# Patient Record
Sex: FEMALE | URBAN - METROPOLITAN AREA
[De-identification: names, ages, dates, MRNs, and addresses within clinical notes are randomized per-mention and may not be internally consistent; named-entity substitution may affect disease eponyms.]

---

## 2017-12-29 ENCOUNTER — TRANSFERRED RECORDS (OUTPATIENT)
Dept: HEALTH INFORMATION MANAGEMENT | Facility: CLINIC | Age: 22
End: 2017-12-29

## 2017-12-30 ENCOUNTER — HOSPITAL ENCOUNTER (INPATIENT)
Facility: CLINIC | Age: 22
LOS: 5 days | Discharge: HOME | End: 2018-01-04
Attending: PSYCHIATRY & NEUROLOGY | Admitting: PSYCHIATRY & NEUROLOGY
Payer: COMMERCIAL

## 2017-12-30 DIAGNOSIS — F33.1 MAJOR DEPRESSIVE DISORDER, RECURRENT EPISODE, MODERATE (H): ICD-10-CM

## 2017-12-30 DIAGNOSIS — F41.9 ANXIETY: Primary | ICD-10-CM

## 2017-12-30 PROBLEM — R45.89 SUICIDAL BEHAVIOR: Status: ACTIVE | Noted: 2017-12-30

## 2017-12-30 PROCEDURE — 12400001 ZZH R&B MH UMMC

## 2017-12-30 PROCEDURE — HZ2ZZZZ DETOXIFICATION SERVICES FOR SUBSTANCE ABUSE TREATMENT: ICD-10-PCS | Performed by: NURSE PRACTITIONER

## 2017-12-30 PROCEDURE — 25000132 ZZH RX MED GY IP 250 OP 250 PS 637: Performed by: PSYCHIATRY & NEUROLOGY

## 2017-12-30 PROCEDURE — 99207 ZZC CDG-HISTORY COMP: MEETS EXP. PROBLEM FOCUSED-DOWN CODED LACK OF ROS: CPT | Performed by: NURSE PRACTITIONER

## 2017-12-30 PROCEDURE — 99221 1ST HOSP IP/OBS SF/LOW 40: CPT | Mod: AI | Performed by: NURSE PRACTITIONER

## 2017-12-30 RX ORDER — OLANZAPINE 10 MG/2ML
10 INJECTION, POWDER, FOR SOLUTION INTRAMUSCULAR
Status: DISCONTINUED | OUTPATIENT
Start: 2017-12-30 | End: 2018-01-04 | Stop reason: HOSPADM

## 2017-12-30 RX ORDER — DIAZEPAM 5 MG
5-20 TABLET ORAL EVERY 30 MIN PRN
Status: DISCONTINUED | OUTPATIENT
Start: 2017-12-30 | End: 2018-01-03

## 2017-12-30 RX ORDER — GABAPENTIN 300 MG/1
300 CAPSULE ORAL 3 TIMES DAILY PRN
Status: DISCONTINUED | OUTPATIENT
Start: 2017-12-30 | End: 2018-01-04 | Stop reason: HOSPADM

## 2017-12-30 RX ORDER — MULTIPLE VITAMINS W/ MINERALS TAB 9MG-400MCG
1 TAB ORAL DAILY
Status: DISCONTINUED | OUTPATIENT
Start: 2017-12-30 | End: 2018-01-04 | Stop reason: HOSPADM

## 2017-12-30 RX ORDER — BISACODYL 10 MG
10 SUPPOSITORY, RECTAL RECTAL DAILY PRN
Status: DISCONTINUED | OUTPATIENT
Start: 2017-12-30 | End: 2018-01-04 | Stop reason: HOSPADM

## 2017-12-30 RX ORDER — DEXTROAMPHETAMINE SACCHARATE, AMPHETAMINE ASPARTATE MONOHYDRATE, DEXTROAMPHETAMINE SULFATE AND AMPHETAMINE SULFATE 6.25; 6.25; 6.25; 6.25 MG/1; MG/1; MG/1; MG/1
25 CAPSULE, EXTENDED RELEASE ORAL EVERY MORNING
COMMUNITY

## 2017-12-30 RX ORDER — LANOLIN ALCOHOL/MO/W.PET/CERES
100 CREAM (GRAM) TOPICAL DAILY
Status: DISPENSED | OUTPATIENT
Start: 2017-12-30 | End: 2018-01-02

## 2017-12-30 RX ORDER — TRAZODONE HYDROCHLORIDE 50 MG/1
50 TABLET, FILM COATED ORAL
Status: DISCONTINUED | OUTPATIENT
Start: 2017-12-30 | End: 2018-01-04 | Stop reason: HOSPADM

## 2017-12-30 RX ORDER — ALUMINA, MAGNESIA, AND SIMETHICONE 2400; 2400; 240 MG/30ML; MG/30ML; MG/30ML
30 SUSPENSION ORAL EVERY 4 HOURS PRN
Status: DISCONTINUED | OUTPATIENT
Start: 2017-12-30 | End: 2018-01-04 | Stop reason: HOSPADM

## 2017-12-30 RX ORDER — OLANZAPINE 10 MG/1
10 TABLET ORAL
Status: DISCONTINUED | OUTPATIENT
Start: 2017-12-30 | End: 2018-01-04 | Stop reason: HOSPADM

## 2017-12-30 RX ORDER — HYDROXYZINE HYDROCHLORIDE 25 MG/1
25-50 TABLET, FILM COATED ORAL EVERY 4 HOURS PRN
Status: DISCONTINUED | OUTPATIENT
Start: 2017-12-30 | End: 2018-01-04 | Stop reason: HOSPADM

## 2017-12-30 RX ORDER — ACETAMINOPHEN 325 MG/1
650 TABLET ORAL EVERY 4 HOURS PRN
Status: DISCONTINUED | OUTPATIENT
Start: 2017-12-30 | End: 2018-01-04 | Stop reason: HOSPADM

## 2017-12-30 RX ORDER — ATENOLOL 50 MG/1
50 TABLET ORAL DAILY PRN
Status: DISCONTINUED | OUTPATIENT
Start: 2017-12-30 | End: 2018-01-04 | Stop reason: HOSPADM

## 2017-12-30 RX ORDER — FOLIC ACID 1 MG/1
1 TABLET ORAL DAILY
Status: DISCONTINUED | OUTPATIENT
Start: 2017-12-30 | End: 2018-01-03

## 2017-12-30 RX ADMIN — DIAZEPAM 10 MG: 5 TABLET ORAL at 08:39

## 2017-12-30 RX ADMIN — DIAZEPAM 10 MG: 5 TABLET ORAL at 20:32

## 2017-12-30 RX ADMIN — DIAZEPAM 10 MG: 5 TABLET ORAL at 12:41

## 2017-12-30 ASSESSMENT — ACTIVITIES OF DAILY LIVING (ADL)
SWALLOWING: 0-->SWALLOWS FOODS/LIQUIDS WITHOUT DIFFICULTY
TRANSFERRING: 0-->INDEPENDENT
RETIRED_COMMUNICATION: 0-->UNDERSTANDS/COMMUNICATES WITHOUT DIFFICULTY
BATHING: 0-->INDEPENDENT
DRESS: INDEPENDENT
PRIOR_FUNCTIONAL_LEVEL_COMMENT: OK
FALL_HISTORY_WITHIN_LAST_SIX_MONTHS: NO
DRESS: 0-->INDEPENDENT
TOILETING: 0-->INDEPENDENT
COGNITION: 2 - DIFFICULTY WITH ORGANIZING THOUGHTS
ORAL_HYGIENE: PROMPTS
AMBULATION: 0-->INDEPENDENT
RETIRED_EATING: 0-->INDEPENDENT
GROOMING: PROMPTS

## 2017-12-30 NOTE — PROGRESS NOTES
Initial Psychosocial Assessment    I have reviewed the chart, met with the patient, and developed Care Plan.      Review of electronic records. Patient is on a 72-hour hold and declined to meet with case management on Saturday 12/30/17 and 12/31/17. All information taken from electronic medical records.    Presenting Problem:  Per ED: Patient was brought in by mom and step-dad to the [facility] ER.  Patient is having suicidal ideation, thoughts to overdose.  Patient did drink and ingested unknown amount of ibuprofen tonight. This is pt's first suicide attempt.  Stressors include fighting with parents over bringing etoh into the house and how much she is drinking.  Patient wrote a suicide note.    History of Mental Health and Chemical Dependency:  Mental health history:  Patient denies any history of mental illness. She denies prior psychiatric hospitalizations, history of psychosis, suicide attempts, self-injury behaviors, violence towards others, history of ECT and the use of any medications for mental illness    Chemical use history:  Patient has a history of alcohol abuse since she turned 22.  She has been drinking 1 bottle of wine each day.  She has never been in treatment or detoxification.  She denies using street drugs or abusing prescription medications.       Family Description (Constellation, Family Psychiatric History):  The patient was born and raised in the [place], [place]. Her parents are  and are both alive.  She has 2 sisters and 3 brothers.  She is a middle child, the 3rd of 6 children.  She has never been , no children.    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 Patient will have psychiatric assessment and medication management by the psychiatrist. Medications will be reviewed and adjusted per MD as indicated. The treatment team will continue to assess and stabilize the patient's mental health symptoms with the use of medications and therapeutic programming. Hospital staff will provide a safe environment and a therapeutic milieu. Staff will continue to assess patient as needed. Patient will participate in unit groups and activities. Patient will receive individual and group support on the unit.  CTC will do individual inpatient treatment planning and after care planning. CTC will discuss options for increasing community supports with the patient. CTC will coordinate with outpatient providers and will place referrals to ensure appropriate follow up care is in place.

## 2017-12-30 NOTE — IP AVS SNAPSHOT
MRN:[id number]                      After Visit Summary   12/30/2017    Griselda Whittaker    MRN: [id number]           Patient Information     Date Of Birth          1995        Designated Caregiver       Most Recent Value    Caregiver    Will someone help with your care after discharge? no      About your hospital stay     You were admitted on:  December 30, 2017 You last received care in the:  [facility]    You were discharged on:  January 4, 2018       Who to Call     For medical emergencies, please call 911.  For non-urgent questions about your medical care, please call your primary care provider or clinic, None          Attending Provider     Provider Specialty    Raffy Welch MD Psychiatry    [facility]       Primary Care Provider    None Specified      Further instructions from your care team       Behavioral Discharge Planning and Instructions      Summary: You were admitted on 12/30/2017 to [facility] due to Suicidal Ideations.  You were treated by Debra Naegele, APRN, CNS and discharged on 01/05/2018 from [facility] Home.     Principal Diagnosis:   Substance Induced Psychosis    Health Care Follow-up Appointments:   Medication Management  Date: 01/08/2018  Time: 2:00PM      Provider: Dr. Andrea Pierre  Address: [address] [place] [address]  Phone: [phone]   [facility] has faxed the Discharge Summary and AVS to this provider at Fax: [phone]    Therapy Appointment   Date: 01/09/2018  Time: 6:00PM      Provider: Ofelia Harding  Address: [facility], [address] [place] [address]  Phone:[phone]    If you are not familiar with the area and are using GPS, be sure to put the SOUTH in the address. The Center is located in the [facility] next to the [facility]. You will see the [employer]. Enter the door next  to the Boutique, go all the way to the back and take the elevator to the 2nd floor. A 24 HOUR CANCELLATION IS REQUIRED.     The [facility] has faxed the Discharge Summary and AVS to this provider at Fax: [phone]    Attend all s cheduled appointments with your outpatient providers. Call at least 24 hours in advance if you need to reschedule an appointment to ensure continued access to your outpatient providers.   Major Treatments, Procedures and Findings: You were provided with: a psychiatric assessment, assessed for medical stability, medication evaluation and/or management, group therapy, art therapy, milieu management, medical interventions and skills/OT groups.    Symptoms to Report: If you experience any of the following symptoms please report them right away to your provider or to family/friends; feeling more aggressive, increased confusion, losing more sleep, mood getting worse or thoughts of suicide.    Early warning signs can include: Early warning signs that could signal a potential relapse could include but not limited to the following; increased depression or anxiety sleep disturbances increased thoughts or behaviors of suicide or self-harm  increased unusual thinking, such as paranoia or hearing voices.    Safety and Wellness: Take all medicines as directed. Make no changes unless your doctor suggests them.      Follow treatment recommendations. Refrain from alcohol and non-prescribed drugs.  Items could include: Firearms  Medicines (both prescribed and over-the-counter)  Knives and other sharp objects  Ropes and like materials  Car keys  If there is a concern for safety, call 911. If there is a concern for safety, call 911..      Resources: Mental health crisis response for your [facility] is offered 24 hours a day, 7 days a week. A trained counselor will assess your current situation, offer support and counseling and connect you with local resources. Please call      Crisis Intervention: 228.394.7087 or  "398.533.1759 (TTY: 511.876.6506).  Call anytime for help.  National Superior on Mental Illness (www.mn.subha.org): 371.390.4414 or 277-818-3038.  [place] Association for Children's Mental Health (www.macmh.org): 866.190.2212.  Alcoholics Anonymous (www.alcoholics-anonymous.org): Check your phone book for your local chapter.  Suicide Awareness Voices of Education (SAVE) (www.save.org): 569-786-ZPXL (3142)  National Suicide Prevention Line (www.mentalhealthmn.org): 064-196-XXRJ (6031)  Mental Health Consumer/Survivor Network of [place] (www.mhcsn.net): 584.780.5655 or 160-598-8591  Mental Health Association of [place] (www.mentalhealth.org): 406.694.2844 or 984-826-9919  [facility] Crisis Response 093-727-8041  Text 4 Life: txt \"LIFE\" to [address] for immediate support and crisis intervention  Crisis text line: Text \"START\" to 778-864. Free, confidential, 24/7.  Crisis Intervention: 171.970.1622 or 740-045-0291. Call anytime for help.     The treatment team has appreciated the opportunity to work with you. Griselda, please take care and make your recovery a daily recovery. If you have any questions or concerns our unit number is [phone].  You will be receiving a follow-up phone call within the next three days from a representative from behavioral health.  You have identified the best phone number to reach you as [phone] (home)         Pending Results     No orders found from 12/28/2017 to 12/31/2017.            Admission Information     Date & Time Department Dept. Phone    12/30/2017 Young Adult Inpatient Mental Health [phone]      Your Vitals Were     Blood Pressure Pulse Temperature Respirations Height Weight    116/67 85 98.3  F (36.8  C) (Oral) 16 1.702 m (5' 7\") 63.5 kg (140 lb)    Pulse Oximetry BMI (Body Mass Index)                95% 21.93 kg/m2          MyChart Information     [facility] lets you send messages to your doctor, view your test results, renew your prescriptions, schedule appointments and more. To sign " "up, go to [email or web address] . Click on \"Log in\" on the left side of the screen, which will take you to the Welcome page. Then click on \"Sign up Now\" on the right side of the page.     You will be asked to enter the access code listed below, as well as some personal information. Please follow the directions to create your username and password.     Your access code is: [email or web address]  Expires: 2018  1:56 PM     Your access code will  in 90 days. If you need help or a new code, please call your [facility] clinic or [phone].        Care EveryWhere ID     This is your Care EveryWhere ID. This could be used by other organizations to access your [facility] medical records  [id number]        Equal Access to Services     JESSICA SAGASTUME : Ge Espinoza, shelly scott, timoteo blanco, prabhu perez . So [facility] [phone].    ATENCIÓN: Si habla español, tiene a hough disposición servicios gratuitos de asistencia lingüística. Alfred al [phone].    We comply with applicable federal civil rights laws and [place] laws. We do not discriminate on the basis of race, color, national origin, age, disability, sex, sexual orientation, or gender identity.               Review of your medicines      START taking        Dose / Directions    DULoxetine 20 MG EC capsule   Commonly known as:  CYMBALTA   Used for:  Major depressive disorder, recurrent episode, moderate (H)        Dose:  20 mg   Start taking on:  2018   Take 1 capsule (20 mg) by mouth daily   Quantity:  60 capsule   Refills:  1       hydrOXYzine 25 MG tablet   Commonly known as:  ATARAX   Used for:  Anxiety        Dose:  25-50 mg   Take 1-2 tablets (25-50 mg) by mouth every 4 hours as needed for anxiety   Quantity:  120 tablet   Refills:  1         CONTINUE these medicines which have NOT CHANGED        Dose / Directions    ADDERALL XR 25 MG 24 hr capsule   Generic drug:  amphetamine-dextroamphetamine "        Dose:  25 mg   Take 25 mg by mouth every morning   Refills:  0            Where to get your medicines      These medications were sent to [facility] [place], [place] - [address]  [address], [facility] [address]     Phone:  [phone]     DULoxetine 20 MG EC capsule    hydrOXYzine 25 MG tablet                Protect others around you: Learn how to safely use, store and throw away your medicines at www.disposemymeds.org.             Medication List: This is a list of all your medications and when to take them. Check marks below indicate your daily home schedule. Keep this list as a reference.      Medications           Morning Afternoon Evening Bedtime As Needed    ADDERALL XR 25 MG 24 hr capsule   Take 25 mg by mouth every morning   Generic drug:  amphetamine-dextroamphetamine                                DULoxetine 20 MG EC capsule   Commonly known as:  CYMBALTA   Take 1 capsule (20 mg) by mouth daily   Start taking on:  1/5/2018   Last time this was given:  20 mg on 1/4/2018  8:24 AM                                hydrOXYzine 25 MG tablet   Commonly known as:  ATARAX   Take 1-2 tablets (25-50 mg) by mouth every 4 hours as needed for anxiety   Last time this was given:  50 mg on 1/3/2018  8:31 PM

## 2017-12-30 NOTE — PROGRESS NOTES
Vino Volo [facility] Date: 17  Query Report Page#: 1  Patient Rx History Report  CHLOÉ PHELPS  Search Criteria: Last Name 'chloé' and First Name 'lori' and  = ' and Request Period = ' to  ' - 3 out of 3 Recipients Selected.  Fill Date Product, Str, Form Qty Days Pt ID Prescriber Written RX# N/R* Pharm **MED+  ---------- -------------------------------- ------ ---- --------- ---------- ---------- ------------ ----- --------- ------  2017 DEXTROAMP-AMPHET ER 25 MG CAP 30.00 30 [id number] 10/10/2017 [id number] N [id number] 00.0  2017 DEXTROAMP-AMPHET ER 25 MG CAP 30.00 30 [id number] 10/10/2017 [id number] N [id number] 00.0  10/10/2017 DEXTROAMP-AMPHET ER 25 MG CAP 1.00 1 [id number] 10/10/2017 [id number] N [id number] 00.0  10/10/2017 DEXTROAMP-AMPHET ER 25 MG CAP 29.00 29 [id number] 10/10/2017 [id number] N [id number] 00.0  2017 DEXTROAMP-AMPHET ER 25 MG CAP 30.00 30 [id number] 2017 [id number] N [id number] 00.0  08/15/2017 DEXTROAMP-AMPHET ER 25 MG CAP 30.00 30 [id number] 2017 [id number] N [id number] 00.0  2017 DEXTROAMP-AMPHET ER 25 MG CAP 30.00 30 [id number] N [id number] 00.0  2017 DEXTROAMP-AMPHET ER 25 MG CAP 30.00 30 [id number] 2017 [id number] N [id number] 00.0  2017 DEXTROAMP-AMPHET ER 25 MG CAP 30.00 30 [id number] N [id number] 00.0  2017 DEXTROAMP-AMPHET ER 25 MG CAP 30.00 30 [id number] 2017 [id number] N [id number] 00.0  2017 DEXTROAMP-AMPHET ER 25 MG CAP 30.00 30 [id number] 2016 [id number] N [id number] 00.0  2017 DEXTROAMP-AMPHET ER 25 MG CAP 30.00 30 [id number] 2016 [id number] N [id number] 00.0  *N/R N=New R=Refill  +MED Daily  Prescribers for prescriptions  listed  ----------------------------------------------------------------------------------------------------------------------------------  [id number] LUIGI DOMINGUEZ MD; [facility], [address] [facility] [address]  Pharmacies that dispensed prescriptions listed  ----------------------------------------------------------------------------------------------------------------------------------  [id number] [facility]; [address] [place] [address] [place] [facility] [address],  Patients that match search criteria  ----------------------------------------------------------------------------------------------------------------------------------  [id number] OBIE PHELPS  [birth year]; [address] [facility] [address]  [id number] OBIE PHELPS,  [birth year][facility] [address]  [id number] OBIE PHELPS,  [birth year]; [address] [facility] [address]  MED Summary  This section displays cumulative MED values by unique recipient. The MED Max value is the maximum occurrence of cumulative MED  sustained for any 3 consecutive days. This value is calculated based on prescriptions dispensed during the date range requested.  -----------------------------------------------------------------------------------------------------------------------------------  0 MATTIE RAGLAND; 1995; [address] [facility] [address]  0 MATTIE RAGLAND; 1995; [address] [facility] [address]

## 2017-12-30 NOTE — IP AVS SNAPSHOT
"    [facility]PAT[facility] MENTAL HEALTH: [phone]                                              INTERAGENCY TRANSFER FORM - LAB / IMAGING / EKG / EMG RESULTS   2017                    Hospital Admission Date: 2017  MATTIE RAGLAND   : 1995  Sex: Female        Attending Provider: Loyd Ngo MD     Allergies:  Not on File    Infection:  None   Service:  MENTAL HEALT    Ht:  1.702 m (5' 7\")   Wt:  63.5 kg (140 lb)   Admission Wt:  63.5 kg (140 lb)    BMI:  21.93 kg/m 2   BSA:  1.73 m 2            Patient PCP Information     None on File         Lab Results - 3 Days      Drug screen urine [[id number]] (Abnormal)  Resulted: 18 1537, Result status: Final result    Ordering provider: Vicente Arriaga APRN CNP  17 0852 Resulting lab: [facility]    Specimen Information    Type Source Collected On   Urine  17 5714          Components       Value Reference Range Flag Lab   Benzodiazepine Qual Urine Positive NEG^Negative A 13   Comment:         Cutoff for a positive benzodiazepine is greater than 200 ng/mL. This is an   unconfirmed screening result to be used for medical purposes only.     Cannabinoids Qual Urine Negative NEG^Negative  13   Comment:  Cutoff for a negative cannabinoid is 50 ng/mL or less.   Cocaine Qual Urine Negative NEG^Negative  13   Comment:  Cutoff for a negative cocaine is 300 ng/mL or less.   Opiates Qualitative Urine Negative NEG^Negative  13   Comment:  Cutoff for a negative opiate is 300 ng/mL or less.   Acetaminophen Qual Negative NEG^Negative  51   Amantadine Qual Negative NEG^Negative  51   Amitriptyline Qual Negative NEG^Negative  51   Amoxapine Qual Negative NEG^Negative  51   Amphetamines Qual Negative NEG^Negative  51   Atropine Qual Negative NEG^Negative  51   Caffeine Qual Positive NEG^Negative A 51   Carbamazepine Qual Negative NEG^Negative  51   Chlorpheniramine Qual Negative NEG^Negative  51 "   Chlorpromazine Qual Negative NEG^Negative  51   Citalopram Qual Negative NEG^Negative  51   Clomipramine Qual Negative NEG^Negative  51   Cocaine Qual Negative NEG^Negative  51   Codeine Qual Negative NEG^Negative  51   Desipramine Qual Negative NEG^Negative  51   Dextromethorphan Qual Negative NEG^Negative  51   Diphenhydramine Qual Negative NEG^Negative  51   Doxepin/metabolite Qual Negative NEG^Negative  51   Doxylamine Qual Negative NEG^Negative  51   Ephedrine or pseudo Qual Negative NEG^Negative  51   Fentanyl Qual Negative NEG^Negative  51   Fluoxetine and metab Qual Negative NEG^Negative  51   Comment:  GABAPENTIN POSITIVE   Hydrocodone Qual Negative NEG^Negative  51   Hydromorphone Qual Negative NEG^Negative  51   Ibuprofen Qual Positive NEG^Negative A 51   Imipramine Qual Negative NEG^Negative  51   Lamotrigine Qual Negative NEG^Negative  51   Loxapine Qual Negative NEG^Negative  51   Maprotiline Qual Negative NEG^Negative  51   MDMA Qual Negative NEG^Negative  51   Meperidine Qual Negative NEG^Negative  51   Methadone Qual Negative NEG^Negative  51   Methamphetamine Qual Negative NEG^Negative  51   Morphine Qual Negative NEG^Negative  51   Nicotine Qual Negative NEG^Negative  51   Nortriptyline Qual Negative NEG^Negative  51   Olanzapine Qual Negative NEG^Negative  51   Oxycodone Qual Negative NEG^Negative  51   Pentazocine Qual Negative NEG^Negative  51   Phencyclidine Qual Negative NEG^Negative  51   Phenmetrazine Qual Negative NEG^Negative  51   Phentermine Qual Negative NEG^Negative  51   Phenylbutazone Qual Negative NEG^Negative  51   Phenylpropanolamine Qual Negative NEG^Negative  51   Propoxyphene Qual Negative NEG^Negative  51   Propranolol Qual Negative NEG^Negative  51   Pyrilamine Qual Negative NEG^Negative  51   Salicylate Qual Negative NEG^Negative  51   Theobromine Qual Positive NEG^Negative A 51   Trimipramine Qual Negative NEG^Negative  51   Topiramate Qual Negative NEG^Negative  51    Venlafaxine Qual Negative NEG^Negative  51   Comment:         This test was developed and its performance characteristics determined by the   [facility],  [facility]. It has   not been cleared or approved by the FDA. The laboratory is regulated under   CLIA as qualified to perform high-complexity testing. This test is used for   clinical purposes. It should not be regarded as investigational or for   research.              Testing Performed By     Lab - Abbreviation Name Director Address Valid Date Range    13 - Unknown [facility] Unknown [address] [facility] [address] 01/15/15 0916 - Present    51 - Unknown [facility] Unknown [address] [facility] [address] 12/31/14 1010 - Present            Unresulted Labs     None      Encounter-Level Documents:     There are no encounter-level documents.      Order-Level Documents:     There are no order-level documents.

## 2017-12-30 NOTE — PHARMACY-ADMISSION MEDICATION HISTORY
Admission Medication History status for the 12/30/2017 admission is complete.  See EPIC admission navigator for Prior to Admission medications.    Medication history sources:  Patient, [facility] [place]     Medication history source reliability: Good    Medication adherence:  Good    Changes made to PTA medication list (reason)  Added: Adderall XR (Pt reported)  Deleted: None  Changed: None    Additional medication history information (including reliability of information, actions taken by pharmacist): -Confirmed Adderall XR prescription with [facility] in [place]    Time spent in this activity: 15min    Medication history completed by: Mann Nichols, Pharmacy Intern       Prior to Admission medications    Medication Sig Last Dose Taking? Auth Provider   amphetamine-dextroamphetamine (ADDERALL XR) 25 MG 24 hr capsule Take 25 mg by mouth every morning 12/29/2017 at Unknown time Yes Unknown, Entered By History

## 2017-12-30 NOTE — IP AVS SNAPSHOT
[facility]    [facility] [address]    [address] [facility] [address]    Phone:  [phone]                                       After Visit Summary   12/30/2017    Griselda Whittaker    MRN: [id number]           After Visit Summary Signature Page     I have received my discharge instructions, and my questions have been answered. I have discussed any challenges I see with this plan with the nurse or doctor.    ..........................................................................................................................................  Patient/Patient Representative Signature      ..........................................................................................................................................  Patient Representative Print Name and Relationship to Patient    ..................................................               ................................................  Date                                            Time    ..........................................................................................................................................  Reviewed by Signature/Title    ...................................................              ..............................................  Date                                                            Time

## 2017-12-30 NOTE — PLAN OF CARE
"Problem: Mood Impairment (Depressive Signs/Symptoms) (Adult)  Goal: Improved Mood Symptoms (Depressive Signs/Symptoms)  Patient, prior to discharge, will:  -report a decrease in depressive signs/symptoms  -report a decrease in anxiety   -verbalize absence of SI/SIB   -identify 3 healthy coping mechanisms   -identify a support system   -verbalize positive feelings about self  -develop a safety plan  -attend at least 50 percent of groups on the unit  -participate in coordination of discharge planning      ADMIT: This is the first admit for this young lady. Pt arrived intoxicated by ambulance around 0745 this morning. Pt was having the dry hedimitrios ordoñez had some emesis in the ED. Pt states she had a fight with her parents last night over her \"drinking habits\". Pt then took a bottle of wine and an unknown amount of motrin last evening and consumed them. Parents called EMT. Pt states she drinks a bottle of wine daily. This RN scored MSSA 13 at 0900 so valium 10 mg was given to the pt. Pt slept. This RN had pt drink water. At 1200, mssa was 10 so valium 10 mg po was again given. Pt has been resting all day and so will continue to push fluids for now. Will continue WD protocol. Pt has contracted for safety. Pt states the valium helps but feels awful when it wears off. Pt denies SI at this time.      "

## 2017-12-30 NOTE — PROGRESS NOTES
12/30/17 1058   Patient Belongings   Did you bring any home meds/supplements to the hospital?  No   Patient Belongings clothing   Disposition of Belongings Locker   Belongings Search Yes   Clothing Search Yes   Second Staff Cj ERAZO      No Items Sent to Security    Items in Pt Bin:   1 pair of brown Ugg boots   1 black crew neck sweatshirt   1 black leggings  1 brown jacket  1 pack of [place] cigarettes (8 remaining)- placed in plastic bag  1 pink lighter- placed in plastic bag  2 tootsie rolls- placed in plastic bag    Brought 1/1/18:  Yellow sweatshirt, 2 pairs of socks, 2 coloring books, sports bra, cat shirt, gel pens, clip board, journal, moccasins (w/ string), black tote bag     A               Admission:  I am responsible for any personal items that are not sent to the safe or pharmacy.  [facility] is not responsible for loss, theft or damage of any property in my possession.    Signature:  _________________________________ Date: _______  Time: _____                                              Staff Signature:  ____________________________ Date: ________  Time: _____      2nd Staff person, if patient is unable/unwilling to sign:    Signature: ________________________________ Date: ________  Time: _____     Discharge:  [facility] has returned all of my personal belongings:    Signature: _________________________________ Date: ________  Time: _____                                          Staff Signature:  ____________________________ Date: ________  Time: _____

## 2017-12-31 LAB
ALBUMIN SERPL-MCNC: 3.9 G/DL (ref 3.4–5)
ALP SERPL-CCNC: 60 U/L (ref 40–150)
ALT SERPL W P-5'-P-CCNC: 22 U/L (ref 0–50)
ANION GAP SERPL CALCULATED.3IONS-SCNC: 9 MMOL/L (ref 3–14)
AST SERPL W P-5'-P-CCNC: 30 U/L (ref 0–45)
BASOPHILS # BLD AUTO: 0 10E9/L (ref 0–0.2)
BASOPHILS NFR BLD AUTO: 0.1 %
BILIRUB SERPL-MCNC: 0.3 MG/DL (ref 0.2–1.3)
BUN SERPL-MCNC: 16 MG/DL (ref 7–30)
CALCIUM SERPL-MCNC: 7.8 MG/DL (ref 8.5–10.1)
CHLORIDE SERPL-SCNC: 109 MMOL/L (ref 94–109)
CHOLEST SERPL-MCNC: 126 MG/DL
CO2 SERPL-SCNC: 21 MMOL/L (ref 20–32)
CREAT SERPL-MCNC: 1.23 MG/DL (ref 0.52–1.04)
DIFFERENTIAL METHOD BLD: NORMAL
EOSINOPHIL # BLD AUTO: 0 10E9/L (ref 0–0.7)
EOSINOPHIL NFR BLD AUTO: 0.3 %
ERYTHROCYTE [DISTWIDTH] IN BLOOD BY AUTOMATED COUNT: 13.1 % (ref 10–15)
GFR SERPL CREATININE-BSD FRML MDRD: 55 ML/MIN/1.7M2
GLUCOSE SERPL-MCNC: 108 MG/DL (ref 70–99)
HCG UR QL: NEGATIVE
HCT VFR BLD AUTO: 42.3 % (ref 35–47)
HDLC SERPL-MCNC: 62 MG/DL
HGB BLD-MCNC: 14.2 G/DL (ref 11.7–15.7)
IMM GRANULOCYTES # BLD: 0 10E9/L (ref 0–0.4)
IMM GRANULOCYTES NFR BLD: 0.2 %
LDLC SERPL CALC-MCNC: 44 MG/DL
LYMPHOCYTES # BLD AUTO: 1.5 10E9/L (ref 0.8–5.3)
LYMPHOCYTES NFR BLD AUTO: 16.1 %
MCH RBC QN AUTO: 32.2 PG (ref 26.5–33)
MCHC RBC AUTO-ENTMCNC: 33.6 G/DL (ref 31.5–36.5)
MCV RBC AUTO: 96 FL (ref 78–100)
MONOCYTES # BLD AUTO: 0.7 10E9/L (ref 0–1.3)
MONOCYTES NFR BLD AUTO: 7.6 %
NEUTROPHILS # BLD AUTO: 7 10E9/L (ref 1.6–8.3)
NEUTROPHILS NFR BLD AUTO: 75.7 %
NONHDLC SERPL-MCNC: 64 MG/DL
NRBC # BLD AUTO: 0 10*3/UL
NRBC BLD AUTO-RTO: 0 /100
PLATELET # BLD AUTO: 325 10E9/L (ref 150–450)
POTASSIUM SERPL-SCNC: 3.8 MMOL/L (ref 3.4–5.3)
PROT SERPL-MCNC: 7.4 G/DL (ref 6.8–8.8)
RBC # BLD AUTO: 4.41 10E12/L (ref 3.8–5.2)
SODIUM SERPL-SCNC: 139 MMOL/L (ref 133–144)
T4 FREE SERPL-MCNC: 1 NG/DL (ref 0.76–1.46)
TRIGL SERPL-MCNC: 99 MG/DL
TSH SERPL DL<=0.005 MIU/L-ACNC: 0.31 MU/L (ref 0.4–4)
WBC # BLD AUTO: 9.3 10E9/L (ref 4–11)

## 2017-12-31 PROCEDURE — 36415 COLL VENOUS BLD VENIPUNCTURE: CPT | Performed by: PSYCHIATRY & NEUROLOGY

## 2017-12-31 PROCEDURE — 84443 ASSAY THYROID STIM HORMONE: CPT | Performed by: PSYCHIATRY & NEUROLOGY

## 2017-12-31 PROCEDURE — 80053 COMPREHEN METABOLIC PANEL: CPT | Performed by: PSYCHIATRY & NEUROLOGY

## 2017-12-31 PROCEDURE — 81025 URINE PREGNANCY TEST: CPT | Performed by: NURSE PRACTITIONER

## 2017-12-31 PROCEDURE — 80307 DRUG TEST PRSMV CHEM ANLYZR: CPT | Performed by: NURSE PRACTITIONER

## 2017-12-31 PROCEDURE — 84439 ASSAY OF FREE THYROXINE: CPT | Performed by: PSYCHIATRY & NEUROLOGY

## 2017-12-31 PROCEDURE — 85025 COMPLETE CBC W/AUTO DIFF WBC: CPT | Performed by: PSYCHIATRY & NEUROLOGY

## 2017-12-31 PROCEDURE — 12400007 ZZH R&B MH INTERMEDIATE UMMC

## 2017-12-31 PROCEDURE — 80061 LIPID PANEL: CPT | Performed by: PSYCHIATRY & NEUROLOGY

## 2017-12-31 PROCEDURE — 25000132 ZZH RX MED GY IP 250 OP 250 PS 637: Performed by: PSYCHIATRY & NEUROLOGY

## 2017-12-31 PROCEDURE — 25000132 ZZH RX MED GY IP 250 OP 250 PS 637: Performed by: NURSE PRACTITIONER

## 2017-12-31 PROCEDURE — 40000358 ZZHCL STATISTIC DRUG SCREEN MULTIPLE (METRO): Performed by: NURSE PRACTITIONER

## 2017-12-31 RX ADMIN — GABAPENTIN 300 MG: 300 CAPSULE ORAL at 09:02

## 2017-12-31 RX ADMIN — DIAZEPAM 10 MG: 5 TABLET ORAL at 09:13

## 2017-12-31 RX ADMIN — FOLIC ACID 1 MG: 1 TABLET ORAL at 09:02

## 2017-12-31 RX ADMIN — MULTIPLE VITAMINS W/ MINERALS TAB 1 TABLET: TAB at 09:02

## 2017-12-31 RX ADMIN — Medication 100 MG: at 09:02

## 2017-12-31 ASSESSMENT — ACTIVITIES OF DAILY LIVING (ADL)
GROOMING: PROMPTS
ORAL_HYGIENE: INDEPENDENT
DRESS: INDEPENDENT
GROOMING: SHOWER;INDEPENDENT
ORAL_HYGIENE: PROMPTS
DRESS: STREET CLOTHES;INDEPENDENT

## 2017-12-31 NOTE — PROGRESS NOTES
Pt was up and about this am. She was a little unsteady on her feet, vitals unstable, pulse 133 so was looking to give her some atenanol beta blocker to decrease the HR but we had run out of this medication. (have contacted pharm). Her MSSA was 11 so pt did receive valium 10 mg po at 0900. This can usually decrease her HR. Pt also received Neurontin 300 mg for anxiety. Pt remains in WD of alcohol at this time.   1200 MSSA was 7, up and eating so will hold off on any medication for the time being.

## 2017-12-31 NOTE — PROGRESS NOTES
Pt denies SI/SIB. Pt states she continues to forget to give UA. Staff reminded her this shift. Pt continues to sleep in room, ate some lunch in her room.      12/31/17 1400   Behavioral Health   Hallucinations denies / not responding to hallucinations   Thinking poor concentration   Orientation person: oriented;place: oriented   Memory (no signs of impairment during interactions)   Insight admits / accepts   Judgement impaired   Eye Contact at examiner   Affect blunted, flat   Mood depressed   Physical Appearance/Attire attire appropriate to age and situation;appears stated age   Hygiene neglected grooming - unclean body, hair, teeth   Suicidality other (see comments)  (denies)   1. Wish to be Dead No   2. Non-Specific Active Suicidal Thoughts  No   Self Injury other (see comment)  (none stated or observed)   Elopement (no concerning behaviors noted this shift)   Activity isolative;withdrawn   Speech clear   Medication Sensitivity sedation   Psychomotor / Gait (in bed all shift)   Safety   Suicidality Status 15   Psycho Education   Type of Intervention 1:1 intervention   Response participates with encouragement   Hours 0.5   Treatment Detail Check in   Activities of Daily Living   Hygiene/Grooming prompts   Oral Hygiene prompts   Dress independent   Room Organization independent   Activity   Activity Assistance Provided independent

## 2017-12-31 NOTE — PROGRESS NOTES
12/30/17 2200   Behavioral Health   Hallucinations other (see comment)  (norma)   Thinking other (see comment)  (norma)   Orientation other (see comment)  (norma)   Memory other (see comment)  (norma)   Insight other (see comment)  (norma)   Judgement (norma)   Eye Contact at examiner   Affect other (see comments)  (norma)   Mood other (see comments)  (norma)   Physical Appearance/Attire disheveled   Hygiene neglected grooming - unclean body, hair, teeth   Suicidality other (see comments)  (norma)   1. Wish to be Dead (norma)   2. Non-Specific Active Suicidal Thoughts  (norma)   Self Injury other (see comment)  (norma)   Elopement (no concerns)   Activity isolative   Speech other (see comments)  (norma)   Medication Sensitivity sedation   Psychomotor / Gait (norma)   Activities of Daily Living   Hygiene/Grooming prompts   Oral Hygiene prompts   Dress independent   Room Organization independent     Writer was unable to check in with pt this evening. Pt refused dinner and was isolative to room the entire shift. Pt appeared to be sleeping the majority of the evening. Staff attempted to wake pt up for groups this evening.

## 2018-01-01 PROCEDURE — 25000132 ZZH RX MED GY IP 250 OP 250 PS 637: Performed by: PSYCHIATRY & NEUROLOGY

## 2018-01-01 PROCEDURE — H2032 ACTIVITY THERAPY, PER 15 MIN: HCPCS

## 2018-01-01 PROCEDURE — 12400007 ZZH R&B MH INTERMEDIATE UMMC

## 2018-01-01 RX ADMIN — Medication 100 MG: at 08:57

## 2018-01-01 RX ADMIN — MULTIPLE VITAMINS W/ MINERALS TAB 1 TABLET: TAB at 08:57

## 2018-01-01 RX ADMIN — FOLIC ACID 1 MG: 1 TABLET ORAL at 08:57

## 2018-01-01 ASSESSMENT — ACTIVITIES OF DAILY LIVING (ADL)
DRESS: SCRUBS (BEHAVIORAL HEALTH)
GROOMING: INDEPENDENT
ORAL_HYGIENE: INDEPENDENT

## 2018-01-01 NOTE — PROGRESS NOTES
Pt did not attend morning group.    Pt attended both afternoon groups. She is quiet, seems to be getting friendly with one male pt in particular, but so far maintaining appropriate boundaries.     Late afternoon group was DBT PLEASE skills for today. There was a collaborative group art project and relaxing music. Pt was fully engaged and cooperative.  He word for the group project was Love- she said that is all she needs right now from family and friends.

## 2018-01-01 NOTE — PROGRESS NOTES
12/31/17 2000   Art Therapy   Type of Intervention structured groups   Response observes from a distance   Hours 1   Pt attended the music/ karaoke group in late afternoon, she was an observer.

## 2018-01-01 NOTE — PROGRESS NOTES
Pt was pleasant, calm and cooperative with staff. Pt was visible in the lounge, but appeared withdrawn/minimally interactive and social with peers. Pt denied having SI/SIB thoughts. Pt endorsed depression and anxiety and rated 7 for depression and 5 for anxiety. Pt attended 1/2 of groups and mostly engaged in reading activity in her room when not in group. Nothing else to report.         01/01/18 1409   Behavioral Health   Hallucinations denies / not responding to hallucinations   Thinking intact   Orientation date: oriented;place: oriented;person: oriented;time: oriented   Memory baseline memory   Insight insight appropriate to situation;insight appropriate to events   Judgement impaired   Eye Contact at examiner   Affect blunted, flat   Mood mood is calm   Physical Appearance/Attire attire appropriate to age and situation;appears stated age;disheveled   Hygiene other (see comment)  (Adequate)   Suicidality (Denies)   1. Wish to be Dead No   2. Non-Specific Active Suicidal Thoughts  No   Self Injury (Denies)   Activity isolative;withdrawn   Speech clear;coherent   Medication Sensitivity no stated side effects;no observed side effects   Psychomotor / Gait steady;balanced   Activities of Daily Living   Hygiene/Grooming independent   Oral Hygiene independent   Dress scrubs (behavioral health)   Room Organization independent

## 2018-01-01 NOTE — PROGRESS NOTES
Pt had a good shift. Pt slept for first part of shift, but was present on milieu later in evening. Pt was calm and cooperative with staff. Pt was withdrawn, and read most of end of evening in room. Pt ate dinner and showered. Pt appeared less sedated and much more clear than yesterday. Pt reported feeling down, but denied SI/SIB.    SI/SIB: denies  Sleep: some what sedated, closer to typical  Appetite: decent  Hygiene: good       12/31/17 2200   Behavioral Health   Hallucinations denies / not responding to hallucinations   Thinking distractable;poor concentration;intact   Orientation person: oriented;place: oriented;date: oriented;time: oriented   Memory baseline memory   Insight insight appropriate to situation;insight appropriate to events   Judgement impaired   Eye Contact at examiner   Affect blunted, flat   Mood depressed;mood is calm   Physical Appearance/Attire attire appropriate to age and situation   Hygiene well groomed   Suicidality other (see comments)  (denies)   1. Wish to be Dead No   2. Non-Specific Active Suicidal Thoughts  No   Self Injury other (see comment)  (denies)   Elopement (none stated or observed)   Activity withdrawn;other (see comment)  (present on milieu)   Speech clear;coherent   Medication Sensitivity no observed side effects   Psychomotor / Gait balanced;steady   Activities of Daily Living   Hygiene/Grooming shower;independent   Oral Hygiene independent   Dress street clothes;independent   Room Organization independent   Activity   Activity Assistance Provided independent

## 2018-01-02 LAB
ACETAMINOPHEN QUAL: NEGATIVE
AMANTADINE: NEGATIVE
AMITRIPTYLINE QUAL: NEGATIVE
AMOXAPINE: NEGATIVE
AMPHETAMINES QUAL: NEGATIVE
ATROPINE: NEGATIVE
BENZODIAZ UR QL: POSITIVE
CAFFEINE QUAL: POSITIVE
CANNABINOIDS UR QL SCN: NEGATIVE
CARBAMAZEPINE QUAL: NEGATIVE
CHLORPHENIRAMINE: NEGATIVE
CHLORPROMAZINE: NEGATIVE
CITALOPRAM QUAL: NEGATIVE
CLOMIPRAMINE QUAL: NEGATIVE
COCAINE QUAL: NEGATIVE
COCAINE UR QL: NEGATIVE
CODEINE QUAL: NEGATIVE
DESIPRAMINE QUAL: NEGATIVE
DEXTROMETHORPHAN: NEGATIVE
DIPHENHYDRAMINE: NEGATIVE
DOXEPIN/METABOLITE: NEGATIVE
DOXYLAMINE: NEGATIVE
EPHEDRINE OR PSEUDO: NEGATIVE
FENTANYL QUAL: NEGATIVE
FLUOXETINE AND METAB: NEGATIVE
HYDROCODONE QUAL: NEGATIVE
HYDROMORPHONE QUAL: NEGATIVE
IBUPROFEN QUAL: POSITIVE
IMIPRAMINE QUAL: NEGATIVE
LAMOTRIGINE QUAL: NEGATIVE
LOXAPINE: NEGATIVE
MAPROTYLINE: NEGATIVE
MDMA QUAL: NEGATIVE
MEPERIDINE QUAL: NEGATIVE
METHAMPHETAMINE: NEGATIVE
METHODONE QUAL: NEGATIVE
MORPHINE QUAL: NEGATIVE
NICOTINE: NEGATIVE
NORTRIPTYLINE QUAL: NEGATIVE
OLANZAPINE QUAL: NEGATIVE
OPIATES UR QL SCN: NEGATIVE
OXYCODONE QUAL: NEGATIVE
PENTAZOCINE: NEGATIVE
PHENCYCLIDINE QUAL: NEGATIVE
PHENMETRAZINE: NEGATIVE
PHENTERMINE: NEGATIVE
PHENYLBUTAZONE: NEGATIVE
PHENYLPROPANOLAMINE: NEGATIVE
PROPOXPHENE QUAL: NEGATIVE
PROPRANOLOL QUAL: NEGATIVE
PYRILAMINE: NEGATIVE
SALICYLATE QUAL: NEGATIVE
THEOBROMINE: POSITIVE
TOPIRAMATE QUAL: NEGATIVE
TRIMIPRAMINE QUAL: NEGATIVE
VENLAFAXINE QUAL: NEGATIVE

## 2018-01-02 PROCEDURE — H2032 ACTIVITY THERAPY, PER 15 MIN: HCPCS

## 2018-01-02 PROCEDURE — 25000132 ZZH RX MED GY IP 250 OP 250 PS 637: Performed by: CLINICAL NURSE SPECIALIST

## 2018-01-02 PROCEDURE — 25000132 ZZH RX MED GY IP 250 OP 250 PS 637: Performed by: PSYCHIATRY & NEUROLOGY

## 2018-01-02 PROCEDURE — 12400007 ZZH R&B MH INTERMEDIATE UMMC

## 2018-01-02 PROCEDURE — 99232 SBSQ HOSP IP/OBS MODERATE 35: CPT | Performed by: CLINICAL NURSE SPECIALIST

## 2018-01-02 RX ORDER — DULOXETIN HYDROCHLORIDE 20 MG/1
20 CAPSULE, DELAYED RELEASE ORAL DAILY
Status: DISCONTINUED | OUTPATIENT
Start: 2018-01-02 | End: 2018-01-04 | Stop reason: HOSPADM

## 2018-01-02 RX ADMIN — MULTIPLE VITAMINS W/ MINERALS TAB 1 TABLET: TAB at 08:33

## 2018-01-02 RX ADMIN — FOLIC ACID 1 MG: 1 TABLET ORAL at 08:33

## 2018-01-02 RX ADMIN — DULOXETINE HYDROCHLORIDE 20 MG: 20 CAPSULE, DELAYED RELEASE ORAL at 14:14

## 2018-01-02 ASSESSMENT — ACTIVITIES OF DAILY LIVING (ADL)
DRESS: INDEPENDENT
GROOMING: INDEPENDENT
GROOMING: INDEPENDENT
DRESS: INDEPENDENT
ORAL_HYGIENE: INDEPENDENT
ORAL_HYGIENE: INDEPENDENT

## 2018-01-02 NOTE — PROGRESS NOTES
"[facility], [place]   Psychiatric Progress Note        Interim History:   The patient's care was discussed with the treatment team during the daily team meeting and/or staff's chart notes were reviewed.  Staff report patient has been attending groups.     Patient reports she stopped taking her medication of Prozac because she did not feel if was effective. She says that she started to use alcohol instead. She was interested in starting medication to treat her depressive symptoms and anxiety. Discussed Cymbalta with patient. She feels fatigued, sleeps  a lot and and has poor motivation along with anxiety.     Continue  [facility] protocol for detoxing from alcohol. Patient will have CD assessment on 1/4.          Medications:       folic acid  1 mg Oral Daily     multivitamin, therapeutic with minerals  1 tablet Oral Daily          Allergies:   Not on File       Labs:   No results found for this or any previous visit (from the past 24 hour(s)).       Psychiatric Examination:     /72  Pulse 88  Temp 98.4  F (36.9  C) (Oral)  Resp 16  Ht 1.702 m (5' 7\")  Wt 63.5 kg (140 lb)  SpO2 95%  BMI 21.93 kg/m2  Weight is 140 lbs 0 oz  Body mass index is 21.93 kg/(m^2).  Orthostatic Vitals       Most Recent      Sitting Orthostatic /72 01/02 0828    Sitting Orthostatic Pulse (bpm) 88 01/02 0828    Standing Orthostatic /64 01/02 0828    Standing Orthostatic Pulse (bpm) 122 01/02 0828            Appearance: awake, alert and adequately groomed  Attitude:  cooperative  Eye Contact:  good  Mood:  depressed  Affect:  intensity is blunted  Speech:  clear, coherent  Psychomotor Behavior:  no evidence of tardive dyskinesia, dystonia, or tics  Throught Process:  logical, linear and goal oriented  Associations:  no loose associations  Thought Content:  no evidence of suicidal ideation or homicidal ideation  Insight:  fair  Judgement:  fair  Oriented to:  time, person, and place  Attention Span " and Concentration:  intact  Recent and Remote Memory:  intact         Precautions:     Behavioral Orders   Procedures     Code 1 - Restrict to Unit     Routine Programming     As clinically indicated     Status 15     Every 15 minutes.     Suicide precautions     Withdrawal precautions          DIagnoses:   1.  Substance induced mood disorder.   2.  Alcohol use disorder.              Plan:   Legal: 72 hour hold expires 1/4    Medication management: Cymbalta 20 mg . Patient was given written information on Cymbalta. Discussed the risks, benefits and side effects of medication with patient.     Dispo: Stabilization with medications. CD treatment, CD assessment scheduled on Thursday 1/4.

## 2018-01-02 NOTE — PROGRESS NOTES
01/01/18 2100   Behavioral Health   Hallucinations other (see comment)  (Pt didn't comment)   Thinking intact   Memory baseline memory   Insight insight appropriate to situation     Observationally, pt was calm, pleasant and cooperative. Author could not check in with pt because she was in bed. Pt ate dinner, hung out in milieu sometime read and made phone calls.

## 2018-01-02 NOTE — PLAN OF CARE
Problem: Mood Impairment (Depressive Signs/Symptoms) (Adult)  Goal: Improved Mood Symptoms (Depressive Signs/Symptoms)  Patient, prior to discharge, will:  -report a decrease in depressive signs/symptoms  -report a decrease in anxiety   -verbalize absence of SI/SIB   -identify 3 healthy coping mechanisms   -identify a support system   -verbalize positive feelings about self  -develop a safety plan  -attend at least 50 percent of groups on the unit  -participate in coordination of discharge planning        48 hour:Pt states she is feeling much better and doesn't feel she needs valium anymore. Pt still feels like she is recovering but does present as much improved in behavior and affect vs this past weekend. Pt is eating and drinking and is easily redirectable at this time.

## 2018-01-03 PROCEDURE — 25000132 ZZH RX MED GY IP 250 OP 250 PS 637: Performed by: PSYCHIATRY & NEUROLOGY

## 2018-01-03 PROCEDURE — 25000132 ZZH RX MED GY IP 250 OP 250 PS 637: Performed by: CLINICAL NURSE SPECIALIST

## 2018-01-03 PROCEDURE — 12400007 ZZH R&B MH INTERMEDIATE UMMC

## 2018-01-03 PROCEDURE — 97150 GROUP THERAPEUTIC PROCEDURES: CPT | Mod: GO

## 2018-01-03 PROCEDURE — 99232 SBSQ HOSP IP/OBS MODERATE 35: CPT | Performed by: CLINICAL NURSE SPECIALIST

## 2018-01-03 RX ORDER — DOCUSATE SODIUM 100 MG/1
100 CAPSULE, LIQUID FILLED ORAL 2 TIMES DAILY
Status: DISCONTINUED | OUTPATIENT
Start: 2018-01-03 | End: 2018-01-04 | Stop reason: HOSPADM

## 2018-01-03 RX ADMIN — DOCUSATE SODIUM 100 MG: 100 CAPSULE, LIQUID FILLED ORAL at 08:51

## 2018-01-03 RX ADMIN — MULTIPLE VITAMINS W/ MINERALS TAB 1 TABLET: TAB at 07:58

## 2018-01-03 RX ADMIN — HYDROXYZINE HYDROCHLORIDE 50 MG: 25 TABLET ORAL at 20:31

## 2018-01-03 RX ADMIN — DULOXETINE HYDROCHLORIDE 20 MG: 20 CAPSULE, DELAYED RELEASE ORAL at 07:58

## 2018-01-03 RX ADMIN — DOCUSATE SODIUM 100 MG: 100 CAPSULE, LIQUID FILLED ORAL at 20:31

## 2018-01-03 RX ADMIN — FOLIC ACID 1 MG: 1 TABLET ORAL at 07:58

## 2018-01-03 ASSESSMENT — ACTIVITIES OF DAILY LIVING (ADL)
ORAL_HYGIENE: INDEPENDENT
ORAL_HYGIENE: INDEPENDENT
DRESS: INDEPENDENT
HYGIENE/GROOMING: INDEPENDENT
DRESS: INDEPENDENT
GROOMING: INDEPENDENT

## 2018-01-03 NOTE — PLAN OF CARE
"Problem: General Plan of Care (Inpatient Behavioral)  Goal: Individualization/Patient Specific Goal (IP Behavioral)  The patient and/or their representative will achieve their patient-specific goals related to the plan of care.    The patient-specific goals include:   Outcome: No Change  Dicussed with patient his/her Personal Plan of Care.      \"Reasons you are in the hospital;\" The patient identifies the following reasons for current hospitalization: Patient states that she is in the hospital due to her alcohol problem and trying to kill herself.      \"Goals for Discharge\" The patient identifies the following goals for discharge: Getting back on antidepressants and counseling.        "

## 2018-01-03 NOTE — PROGRESS NOTES
"[facility], [place]   Psychiatric Progress Note        Interim History:   The patient's care was discussed with the treatment team during the daily team meeting and/or staff's chart notes were reviewed.  Staff report patient has been attending gorups.     Patient continues to report struggling with anxiety. She declined Inderal.. Encouraged patient to use hydroxyzine for anxiety. Patient is reporting an improved mood. She reports insomnia the last two nights. Encouraged patient to use Trazodone.     Patient presents in an organized manner. She denies suicidal ideation. She continues to have anxiety. She wants to develop more effective coping skills to manage her anxiety.          Medications:       DULoxetine  20 mg Oral Daily     multivitamin, therapeutic with minerals  1 tablet Oral Daily          Allergies:   Not on File       Labs:   No results found for this or any previous visit (from the past 24 hour(s)).       Psychiatric Examination:     /80  Pulse 110  Temp 97.5  F (36.4  C) (Oral)  Resp 16  Ht 1.702 m (5' 7\")  Wt 63.5 kg (140 lb)  SpO2 95%  BMI 21.93 kg/m2  Weight is 140 lbs 0 oz  Body mass index is 21.93 kg/(m^2).  Orthostatic Vitals       Most Recent      Sitting Orthostatic /80 01/03 0744    Sitting Orthostatic Pulse (bpm) 110 01/03 0744    Standing Orthostatic /73 01/03 0744    Standing Orthostatic Pulse (bpm) 123 01/03 0744          Appearance: awake, alert and adequately groomed  Attitude:  cooperative  Eye Contact:  good  Mood:  depressed  Affect:  intensity is blunted  Speech:  clear, coherent  Psychomotor Behavior:  no evidence of tardive dyskinesia, dystonia, or tics  Throught Process:  logical, linear and goal oriented  Associations:  no loose associations  Thought Content:  no evidence of suicidal ideation or homicidal ideation  Insight:  fair  Judgement:  fair  Oriented to:  time, person, and place  Attention Span and Concentration:  " intact  Recent and Remote Memory:  intact            Precautions:     Behavioral Orders   Procedures     Code 1 - Restrict to Unit     Routine Programming     As clinically indicated     Status 15     Every 15 minutes.     Suicide precautions          DIagnoses:   1.  Substance induced mood disorder.   2.  Alcohol use disorder.          Plan:     Legal: Voluntary     Medication management: Cymbalta 20 mg . Patient was given written information on Cymbalta. Discussed the risks, benefits and side effects of medication with patient.      Dispo: Stabilization with medications. CD treatment, CD assessment scheduled on Thursday 1/4.

## 2018-01-03 NOTE — PROGRESS NOTES
"   01/02/18 2200   Behavioral Health   Hallucinations denies / not responding to hallucinations   Thinking intact   Orientation person: oriented;place: oriented;date: oriented;time: oriented   Memory baseline memory   Insight insight appropriate to situation   Judgement (limited)   Eye Contact at examiner   Affect blunted, flat   Mood anxious   Physical Appearance/Attire attire appropriate to age and situation   Hygiene well groomed   Suicidality other (see comments)  (pt denied SI)   1. Wish to be Dead No   2. Non-Specific Active Suicidal Thoughts  No   Self Injury other (see comment)  (pt denied SIB)   Elopement (no concerns)   Activity withdrawn   Speech clear;coherent   Medication Sensitivity no stated side effects;no observed side effects   Psychomotor / Gait balanced;steady   Activities of Daily Living   Hygiene/Grooming independent   Oral Hygiene independent   Dress independent   Room Organization independent     Pt denied SI and SIB. Pt ate dinner. Pt rated her anxiety at 8/10. Pt reported that prior to her admission at the hospital, she had been depressed and had used alcohol as a way to cope. Pt reported she wants to try outpatient here and return to her work. Pt reported she has not been sleeping well the past two nights and has been waking up with \"sharp pains\" in her sides. Pt was withdrawn but visible in the milieu. Pt is cooperative with staff.  "

## 2018-01-03 NOTE — PROGRESS NOTES
Attendence: Pt. Attended scheduled 1 of 3 OT sessions today.   Observations: pt minimally verbalized remaining close to self and not engaging in discussion with peers. Pt had flat affect and remained focused on task until completion without talking, though gave minimal answers when approached. Pt left group at the end without talking to peers. Little was learned about this pt due to lack of verbalizing.     01/03/18 1600   Occupational Therapy   Type of Intervention structured groups   Response Participates   Hours 1

## 2018-01-04 VITALS
WEIGHT: 140 LBS | BODY MASS INDEX: 21.97 KG/M2 | OXYGEN SATURATION: 95 % | HEIGHT: 67 IN | SYSTOLIC BLOOD PRESSURE: 116 MMHG | DIASTOLIC BLOOD PRESSURE: 67 MMHG | HEART RATE: 85 BPM | RESPIRATION RATE: 16 BRPM | TEMPERATURE: 98.3 F

## 2018-01-04 PROCEDURE — 25000132 ZZH RX MED GY IP 250 OP 250 PS 637: Performed by: CLINICAL NURSE SPECIALIST

## 2018-01-04 PROCEDURE — 99239 HOSP IP/OBS DSCHRG MGMT >30: CPT | Performed by: CLINICAL NURSE SPECIALIST

## 2018-01-04 PROCEDURE — 25000132 ZZH RX MED GY IP 250 OP 250 PS 637: Performed by: PSYCHIATRY & NEUROLOGY

## 2018-01-04 RX ORDER — HYDROXYZINE HYDROCHLORIDE 25 MG/1
25-50 TABLET, FILM COATED ORAL EVERY 4 HOURS PRN
Qty: 120 TABLET | Refills: 1 | Status: SHIPPED | OUTPATIENT
Start: 2018-01-04

## 2018-01-04 RX ORDER — DULOXETIN HYDROCHLORIDE 20 MG/1
20 CAPSULE, DELAYED RELEASE ORAL DAILY
Qty: 60 CAPSULE | Refills: 1 | Status: SHIPPED | OUTPATIENT
Start: 2018-01-05

## 2018-01-04 RX ADMIN — DULOXETINE HYDROCHLORIDE 20 MG: 20 CAPSULE, DELAYED RELEASE ORAL at 08:24

## 2018-01-04 RX ADMIN — MULTIPLE VITAMINS W/ MINERALS TAB 1 TABLET: TAB at 08:24

## 2018-01-04 RX ADMIN — DOCUSATE SODIUM 100 MG: 100 CAPSULE, LIQUID FILLED ORAL at 08:24

## 2018-01-04 ASSESSMENT — ACTIVITIES OF DAILY LIVING (ADL)
DRESS: INDEPENDENT
HYGIENE/GROOMING: INDEPENDENT
ORAL_HYGIENE: INDEPENDENT

## 2018-01-04 NOTE — PROGRESS NOTES
DISCHARGE: This RN has reviewed all meds and aftercare plan with pt. Pt denies SI, remains quiet and endorses mild depression. Pt belongings returned. Pt smiling expressing that she is excited to DC today.

## 2018-01-04 NOTE — PROGRESS NOTES
Pt was visible in milieu for most of shift but withdrawn. Pt reported feeling good at check in and is hoping to be discharged tomorrow. Pt says that she feels better than when she was admitted but not the best yet. Pt was visible in milieu for part of shift but did not interact with peers much. Denies SI, SIB, AH, and VH         01/03/18 2100   Behavioral Health   Hallucinations denies / not responding to hallucinations   Thinking intact   Orientation person: oriented;place: oriented   Memory baseline memory   Insight insight appropriate to situation   Judgement impaired   Eye Contact at examiner   Affect blunted, flat   Mood mood is calm   Physical Appearance/Attire attire appropriate to age and situation;appears stated age   Hygiene well groomed   Suicidality other (see comments)  (denies)   1. Wish to be Dead No   2. Non-Specific Active Suicidal Thoughts  No   Self Injury other (see comment)  (denies)   Elopement (no concerns )   Activity other (see comment);withdrawn  (visible in milieu)   Speech coherent;clear   Medication Sensitivity no observed side effects;no stated side effects   Psycho Education   Type of Intervention 1:1 intervention   Response participates, initiates socially appropriate   Hours 0.5   Activities of Daily Living   Hygiene/Grooming independent   Oral Hygiene independent   Dress independent   Room Organization independent

## 2018-01-04 NOTE — DISCHARGE INSTRUCTIONS
Behavioral Discharge Planning and Instructions      Summary: You were admitted on 12/30/2017 to [facility] [place] due to Suicidal Ideations.  You were treated by Debra Naegele, APRN, CNS and discharged on 01/05/2018 from Station [address] [place] [address] to Home.     Principal Diagnosis:   Substance Induced Psychosis    Health Care Follow-up Appointments:   Medication Management  Date: 01/08/2018  Time: 2:00PM      Provider: Dr. Andrea Pierre  Address: [address] [facility] [address] [place] [address]  Phone: [phone]   [facility] has faxed the Discharge Summary and AVS to this provider at Fax: [phone]    Therapy Appointment   Date: 01/09/2018  Time: 6:00PM      Provider: Ofelia Harding  Address: [facility], [address] [place] [address]  Phone:[phone]    If you are not familiar with the area and are using GPS, be sure to put the SOUTH in the address. The Center is located in the [facility] next to the [facility]. You will see the [employer]. Enter the door next to the Boutique, go all the way to the back and take the elevator to the 2nd floor. A 24 HOUR CANCELLATION IS REQUIRED.     The [facility] has faxed the Discharge Summary and AVS to this provider at Fax: [phone]    Attend all s cheduled appointments with your outpatient providers. Call at least 24 hours in advance if you need to reschedule an appointment to ensure continued access to your outpatient providers.   Major Treatments, Procedures and Findings: You were provided with: a psychiatric assessment, assessed for medical stability, medication evaluation and/or management, group therapy, art therapy, milieu management, medical interventions and skills/OT groups.    Symptoms to Report: If you experience any of the following symptoms please report them right away to your provider or to family/friends; feeling more aggressive, increased confusion, losing more sleep, mood getting worse  "or thoughts of suicide.    Early warning signs can include: Early warning signs that could signal a potential relapse could include but not limited to the following; increased depression or anxiety sleep disturbances increased thoughts or behaviors of suicide or self-harm  increased unusual thinking, such as paranoia or hearing voices.    Safety and Wellness: Take all medicines as directed. Make no changes unless your doctor suggests them.      Follow treatment recommendations. Refrain from alcohol and non-prescribed drugs.  Items could include: Firearms  Medicines (both prescribed and over-the-counter)  Knives and other sharp objects  Ropes and like materials  Car keys  If there is a concern for safety, call 911. If there is a concern for safety, call 911..      Resources: Mental health crisis response for your [facility] is offered 24 hours a day, 7 days a week. A trained counselor will assess your current situation, offer support and counseling and connect you with local resources. Please call      Crisis Intervention: 800.605.6516 or [phone] (TTY: [phone]).  Call anytime for help.  National [place] on Mental Illness (www.mn.subha.org): 838.690.2660 or 839-295-4458.  [place] Association for Children's Mental Health (www.macmh.org): 561.533.7990.  Alcoholics Anonymous (www.alcoholics-anonymous.org): Check your phone book for your local chapter.  Suicide Awareness Voices of Education (SAVE) (www.save.org): 064-527-ZKYV (3421)  National Suicide Prevention Line (www.mentalhealthmn.org): 420-691-DQHT (8545)  Mental Health Consumer/Survivor Network of [place] (www.mhcsn.net): 417.697.6855 or 521-595-7498  Mental [place] Association [place] (www.mentalhealth.org): 620.866.9600 or 565-385-4390  [facility] Crisis Response [phone]  Text 4 Life: txt \"LIFE\" to [address] for immediate support and crisis intervention  Crisis text line: Text \"START\" to 684-061. Free, confidential, 24/7.  Crisis Intervention: 198.176.4285 or " 526.214.6201. Call anytime for help.     The treatment team has appreciated the opportunity to work with you. Griselda, please take care and make your recovery a daily recovery. If you have any questions or concerns our unit number is [phone].  You will be receiving a follow-up phone call within the next three days from a representative from behavioral health.  You have identified the best phone number to reach you as [phone] (home)

## 2018-01-04 NOTE — DISCHARGE SUMMARY
Psychiatric Discharge Summary    Griselda Whittaker MRN# [id number]   Age: 22 year old YOB: 1995     Date of Admission:  12/30/2017  Date of Discharge:  1/4/2018  3:04 PM  Admitting Physician:  Loyd Ngo MD  Discharge Physician:  Debra A. Naegele, APRN CNS (Contact: [phone])         Event Leading to Hospitalization:   Griselda Whittaker is a very pleasant 22-year-old female admitted after a suicide attempt.  Last evening she overdosed on Advil and 1 bottle of wine after a fight with her parents.  She reports that her parents think her drinking is out of control and want her to stop drinking, which is straining their relationships.  The patient does not feel that her drinking is over the top but admits to having alcohol cravings every day.  The patient states that she started drinking at age 22.  She has no prior history of alcohol abuse.  States that since her birthday on [birth year], she has been drinking 1 bottle of wine every day.       States this is not the first time she has overdosed; it happened once or twice in the past by taking over-the-counter medications with alcohol.  States that her overdoses are impulsive and not related to feeling depressed.  She denies any history of depression, crystal, psychosis, anxiety, PTSD, OCD, eating disorder and borderline personality disorder.  Griselda is currently lying in bed. She is having significant withdrawal symptoms, mainly feeling sick to her stomach.  Currently denies depressive symptoms, she is sleeping well, energy is good, memory and concentration are intact, appetite is normal.  She is denying suicidal ideation. Denies feeling hopeless, helpless and worthless.  She denies anxiety.  She denies  self-injurious behaviors.  The patient has a history of ADHD.  She has been prescribed Adderall for several years.  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  She works full-time.       SUBSTANCE USE HISTORY:  Patient has a  history of alcohol abuse since she turned 22.  She has been drinking 1 bottle of wine each day.  She has never been in treatment or detoxification.  She denies using street drugs or abusing prescription medications       See Admission note by Jaskaran Arriaga CNP found on 12/30/2017 for additional details.          DIagnoses:   1.  Substance induced mood disorder.   2.  Alcohol use disorder.           Labs:     Results for orders placed or performed during the hospital encounter of 12/30/17   HCG qualitative urine   Result Value Ref Range    HCG Qual Urine Negative NEG^Negative   Drug screen urine   Result Value Ref Range    Benzodiazepine Qual Urine Positive (A) NEG^Negative    Cannabinoids Qual Urine Negative NEG^Negative    Cocaine Qual Urine Negative NEG^Negative    Opiates Qualitative Urine Negative NEG^Negative    Acetaminophen Qual Negative NEG^Negative    Amantadine Qual Negative NEG^Negative    Amitriptyline Qual Negative NEG^Negative    Amoxapine Qual Negative NEG^Negative    Amphetamines Qual Negative NEG^Negative    Atropine Qual Negative NEG^Negative    Caffeine Qual Positive (A) NEG^Negative    Carbamazepine Qual Negative NEG^Negative    Chlorpheniramine Qual Negative NEG^Negative    Chlorpromazine Qual Negative NEG^Negative    Citalopram Qual Negative NEG^Negative    Clomipramine Qual Negative NEG^Negative    Cocaine Qual Negative NEG^Negative    Codeine Qual Negative NEG^Negative    Desipramine Qual Negative NEG^Negative    Dextromethorphan Qual Negative NEG^Negative    Diphenhydramine Qual Negative NEG^Negative    Doxepin/metabolite Qual Negative NEG^Negative    Doxylamine Qual Negative NEG^Negative    Ephedrine or pseudo Qual Negative NEG^Negative    Fentanyl Qual Negative NEG^Negative    Fluoxetine and metab Qual Negative NEG^Negative    Hydrocodone Qual Negative NEG^Negative    Hydromorphone Qual Negative NEG^Negative    Ibuprofen Qual Positive (A) NEG^Negative    Imipramine Qual Negative  NEG^Negative    Lamotrigine Qual Negative NEG^Negative    Loxapine Qual Negative NEG^Negative    Maprotiline Qual Negative NEG^Negative    MDMA Qual Negative NEG^Negative    Meperidine Qual Negative NEG^Negative    Methadone Qual Negative NEG^Negative    Methamphetamine Qual Negative NEG^Negative    Morphine Qual Negative NEG^Negative    Nicotine Qual Negative NEG^Negative    Nortriptyline Qual Negative NEG^Negative    Olanzapine Qual Negative NEG^Negative    Oxycodone Qual Negative NEG^Negative    Pentazocine Qual Negative NEG^Negative    Phencyclidine Qual Negative NEG^Negative    Phenmetrazine Qual Negative NEG^Negative    Phentermine Qual Negative NEG^Negative    Phenylbutazone Qual Negative NEG^Negative    Phenylpropanolamine Qual Negative NEG^Negative    Propoxyphene Qual Negative NEG^Negative    Propranolol Qual Negative NEG^Negative    Pyrilamine Qual Negative NEG^Negative    Salicylate Qual Negative NEG^Negative    Theobromine Qual Positive (A) NEG^Negative    Trimipramine Qual Negative NEG^Negative    Topiramate Qual Negative NEG^Negative    Venlafaxine Qual Negative NEG^Negative   CBC with platelets differential   Result Value Ref Range    WBC 9.3 4.0 - 11.0 10e9/L    RBC Count 4.41 3.8 - 5.2 10e12/L    Hemoglobin 14.2 11.7 - 15.7 g/dL    Hematocrit 42.3 35.0 - 47.0 %    MCV 96 78 - 100 fl    MCH 32.2 26.5 - 33.0 pg    MCHC 33.6 31.5 - 36.5 g/dL    RDW 13.1 10.0 - 15.0 %    Platelet Count 325 150 - 450 10e9/L    Diff Method Automated Method     % Neutrophils 75.7 %    % Lymphocytes 16.1 %    % Monocytes 7.6 %    % Eosinophils 0.3 %    % Basophils 0.1 %    % Immature Granulocytes 0.2 %    Nucleated RBCs 0 0 /100    Absolute Neutrophil 7.0 1.6 - 8.3 10e9/L    Absolute Lymphocytes 1.5 0.8 - 5.3 10e9/L    Absolute Monocytes 0.7 0.0 - 1.3 10e9/L    Absolute Eosinophils 0.0 0.0 - 0.7 10e9/L    Absolute Basophils 0.0 0.0 - 0.2 10e9/L    Abs Immature Granulocytes 0.0 0 - 0.4 10e9/L    Absolute Nucleated RBC 0.0     Comprehensive metabolic panel   Result Value Ref Range    Sodium 139 133 - 144 mmol/L    Potassium 3.8 3.4 - 5.3 mmol/L    Chloride 109 94 - 109 mmol/L    Carbon Dioxide 21 20 - 32 mmol/L    Anion Gap 9 3 - 14 mmol/L    Glucose 108 (H) 70 - 99 mg/dL    Urea Nitrogen 16 7 - 30 mg/dL    Creatinine 1.23 (H) 0.52 - 1.04 mg/dL    GFR Estimate 55 (L) >60 mL/min/1.7m2    GFR Estimate If Black 66 >60 mL/min/1.7m2    Calcium 7.8 (L) 8.5 - 10.1 mg/dL    Bilirubin Total 0.3 0.2 - 1.3 mg/dL    Albumin 3.9 3.4 - 5.0 g/dL    Protein Total 7.4 6.8 - 8.8 g/dL    Alkaline Phosphatase 60 40 - 150 U/L    ALT 22 0 - 50 U/L    AST 30 0 - 45 U/L   Lipid panel   Result Value Ref Range    Cholesterol 126 <200 mg/dL    Triglycerides 99 <150 mg/dL    HDL Cholesterol 62 >49 mg/dL    LDL Cholesterol Calculated 44 <100 mg/dL    Non HDL Cholesterol 64 <130 mg/dL   TSH with free T4 reflex and/or T3 as indicated   Result Value Ref Range    TSH 0.31 (L) 0.40 - 4.00 mU/L   T4 free   Result Value Ref Range    T4 Free 1.00 0.76 - 1.46 ng/dL            Consults:   No consultations this admission.         Hospital Course:   Griselda Whittaker was admitted to [facility] with attending No att. providers found as a voluntary patient. The patient was placed under status 15 (15 minute checks) to ensure patient safety.      MSSA protocol was initiated due to the patient's history of alcohol abuse and concern for withdrawal symptoms.  Pateint safely detoxed from alcohol.    Patient reports she discontinued her prescribed medications and used alcohol. She acknowledges this is a problem in her life. She was interested in starting Cymbalta 20 mg and feels that her mood has improved. She uses hydroxyzine for anxiety. Discussed risk, benefits and side effects of medications with patient.     Patient was given education on the detrimental effects of alcohol on mental health and prescribed medications.    Griselda Whittaker did participate in groups and was visible  in the milieu.The patient's symptoms of suicidal ideation improved. Patient presented with a stable mood and denies suicidal ideation. She has protective factors of supportive parents and seeking out treatment. She participated in CD assessment and wants to attend CD programming.     Patient no longer meets criteria for hospitalization. She is at low risk of relapse. Recommendation is to abstain from all mood altering substances including alcohol.    Griselda Whittaker was released to home. At the time of discharge Griselda Whittaker was determined to not be a danger to herself or others.          Discharge Medications:     Discharge Medication List as of 1/4/2018  4:40 PM      START taking these medications    Details   hydrOXYzine (ATARAX) 25 MG tablet Take 1-2 tablets (25-50 mg) by mouth every 4 hours as needed for anxiety, Disp-120 tablet, R-1, E-Prescribe      DULoxetine (CYMBALTA) 20 MG EC capsule Take 1 capsule (20 mg) by mouth daily, Disp-60 capsule, R-1, E-Prescribe         CONTINUE these medications which have NOT CHANGED    Details   amphetamine-dextroamphetamine (ADDERALL XR) 25 MG 24 hr capsule Take 25 mg by mouth every morning, Historical                  Psychiatric Examination:   Appearance:  awake, alert and adequately groomed  Attitude:  cooperative  Eye Contact:  good  Mood:  good  Affect:  appropriate and in normal range  Speech:  clear, coherent  Psychomotor Behavior:  no evidence of tardive dyskinesia, dystonia, or tics  Thought Process:  logical, linear and goal oriented  Associations:  no loose associations  Thought Content:  no evidence of suicidal ideation or homicidal ideation  Insight:  fair  Judgment:  fair  Oriented to:  time, person, and place  Attention Span and Concentration:  intact  Recent and Remote Memory:  intact  Language: Able to name objects, Able to repeat phrases and Able to read and write  Fund of Knowledge:adequate  Muscle Strength and Tone: normal  Gait and Station:  Normal         Discharge Plan:   Summary: You were admitted on 12/30/2017 to [facility] [place] due to Suicidal Ideations.  You were treated by Debra Naegele, APRN, CNS and discharged on 01/05/2018 from [facility] [place] to Home.      Principal Diagnosis:   Substance Induced Psychosis     Health Care Follow-up Appointments:   Medication Management  Date: 01/08/2018  Time: 2:00PM      Provider: Dr. Andrea Pierre  Address: [address] [facility] [address] [place] [address]  Phone: [phone]   [facility] [place] the [place] Summary and AVS to this provider at Fax: [phone]     Therapy Appointment   Date: 01/09/2018  Time: 6:00PM      Provider: Ofelia Harding  Address: [facility], [address] [place] [address]  Phone:[phone]    If you are not familiar with the area and are using GPS, be sure to put the SOUTH in the address. The Center is located in the [facility] next to the [facility]. You will see the [employer]. Enter the door next to the Boutique, go all the way to the back and take the elevator to the 2nd floor. A 24 HOUR CANCELLATION IS REQUIRED.      [facility] [place] faxed the Discharge Summary and AVS to this provider at Fax: [phone]     Attend all s cheduled appointments with your outpatient providers. Call at least 24 hours in advance if you need to reschedule an appointment to ensure continued access to your outpatient providers.   Major Treatments, Procedures and Findings: You were provided with: a psychiatric assessment, assessed for medical stability, medication evaluation and/or management, group therapy, art therapy, milieu management, medical interventions and skills/OT groups.     Symptoms to Report: If you experience any of the following symptoms please report them right away to your provider or to family/friends; feeling more aggressive, increased confusion, losing more sleep, mood getting worse or thoughts of  "suicide.     Early warning signs can include: Early warning signs that could signal a potential relapse could include but not limited to the following; increased depression or anxiety sleep disturbances increased thoughts or behaviors of suicide or self-harm  increased unusual thinking, such as paranoia or hearing voices.     Safety and Wellness: Take all medicines as directed. Make no changes unless your doctor suggests them.      Follow treatment recommendations. Refrain from alcohol and non-prescribed drugs.  Items could include: Firearms  Medicines (both prescribed and over-the-counter)  Knives and other sharp objects  Ropes and like materials  Car keys  If there is a concern for safety, call 911. If there is a concern for safety, call 911..        Resources: Mental health crisis response for your [facility] is offered 24 hours a day, 7 days a week. A trained counselor will assess your current situation, offer support and counseling and connect you with local resources. Please call       Crisis Intervention: 229.709.8893 or 192-410-9935 (TTY: 618.857.7132).  Call anytime for help.  National [place] on Mental Illness (www.mn.subha.org): 417.875.9017 or 464-545-1535.  [place] Association for Children's Mental Health (www.macmh.org): 870.822.9139.  Alcoholics Anonymous (www.alcoholics-anonymous.org): Check your phone book for your local chapter.  Suicide Awareness Voices of Education (SAVE) (www.save.org): 080-260-FARD (4015)  National Suicide Prevention Line (www.mentalhealthmn.org): 799-951-CPOY (6058)  Mental Health Consumer/Survivor Network of [place] (www.mhcsn.net): 413.458.9707 or 694-528-1569  Mental Health Association of [place] (www.mentalhealth.org): 901.299.3239 or 558-057-1443  [facility] Crisis Response 460-909-5853  Text 4 Life: txt \"LIFE\" to [address] for immediate support and crisis intervention  Crisis text line: Text \"START\" to 537-286. Free, confidential, 24/7.  Crisis Intervention: 717.365.4282 or 353-847-7242. " Call anytime for help.      The treatment team has appreciated the opportunity to work with you. Griselda, please take care and make your recovery a daily recovery. If you have any questions or concerns our unit number is [phone].  You will be receiving a follow-up phone call within the next three days from a representative from behavioral health.  You have identified the best phone number to reach you as [phone] (home)   Attestation:  The patient has been seen and evaluated by me,  Debra A. Naegele, APRN CNS on 1/4/2018  Discharge summary time > 30 minutes